# Patient Record
Sex: FEMALE | Race: BLACK OR AFRICAN AMERICAN | URBAN - NONMETROPOLITAN AREA
[De-identification: names, ages, dates, MRNs, and addresses within clinical notes are randomized per-mention and may not be internally consistent; named-entity substitution may affect disease eponyms.]

---

## 2019-02-04 ENCOUNTER — IMPORTED ENCOUNTER (OUTPATIENT)
Dept: URBAN - NONMETROPOLITAN AREA CLINIC 1 | Facility: CLINIC | Age: 39
End: 2019-02-04

## 2019-02-04 PROBLEM — H52.13: Noted: 2019-02-04

## 2019-02-04 PROBLEM — H52.223: Noted: 2019-02-04

## 2019-02-04 PROCEDURE — S0620 ROUTINE OPHTHALMOLOGICAL EXA: HCPCS

## 2019-02-04 NOTE — PATIENT DISCUSSION
Astigmatism-Discussed diagnosis with patient. Myopia-Discussed diagnosis with patient. -Explained that people who are myopic are at a higher risk for developing RD/RT and reviewed associated S&S.-Pt to contact our office if symptoms develop. Updated spec Rx given. Recommend lens that will provide comfort as well as protect safety and health of eyes.

## 2022-04-09 ASSESSMENT — VISUAL ACUITY
OS_CC: J1
OS_SC: 20/40
OU_SC: 20/40
OD_SC: 20/40
OD_CC: J1

## 2022-04-09 ASSESSMENT — TONOMETRY
OD_IOP_MMHG: 16
OS_IOP_MMHG: 16

## 2025-06-26 ENCOUNTER — NEW PATIENT (OUTPATIENT)
Age: 45
End: 2025-06-26

## 2025-06-26 DIAGNOSIS — H53.19: ICD-10-CM

## 2025-06-26 DIAGNOSIS — H53.453: ICD-10-CM

## 2025-06-26 PROCEDURE — 92083 EXTENDED VISUAL FIELD XM: CPT

## 2025-06-26 PROCEDURE — 92004 COMPRE OPH EXAM NEW PT 1/>: CPT
